# Patient Record
Sex: FEMALE | Race: OTHER | HISPANIC OR LATINO | URBAN - METROPOLITAN AREA
[De-identification: names, ages, dates, MRNs, and addresses within clinical notes are randomized per-mention and may not be internally consistent; named-entity substitution may affect disease eponyms.]

---

## 2022-04-05 ENCOUNTER — EMERGENCY (EMERGENCY)
Facility: HOSPITAL | Age: 57
LOS: 1 days | Discharge: HOME | End: 2022-04-05
Attending: EMERGENCY MEDICINE
Payer: COMMERCIAL

## 2022-04-05 VITALS
HEART RATE: 82 BPM | TEMPERATURE: 98 F | RESPIRATION RATE: 17 BRPM | OXYGEN SATURATION: 99 % | HEIGHT: 61 IN | WEIGHT: 121.03 LBS | SYSTOLIC BLOOD PRESSURE: 147 MMHG | DIASTOLIC BLOOD PRESSURE: 79 MMHG

## 2022-04-05 PROCEDURE — 99284 EMERGENCY DEPT VISIT MOD MDM: CPT

## 2022-04-05 PROCEDURE — 96372 THER/PROPH/DIAG INJ SC/IM: CPT

## 2022-04-05 PROCEDURE — 99283 EMERGENCY DEPT VISIT LOW MDM: CPT | Mod: 25

## 2022-04-05 RX ORDER — IBUPROFEN 200 MG
1 TABLET ORAL
Qty: 56 | Refills: 0
Start: 2022-04-05 | End: 2022-04-18

## 2022-04-05 RX ORDER — METHOCARBAMOL 500 MG/1
2 TABLET, FILM COATED ORAL
Qty: 30 | Refills: 0
Start: 2022-04-05 | End: 2022-04-09

## 2022-04-05 RX ORDER — ACETAMINOPHEN 500 MG
650 TABLET ORAL ONCE
Refills: 0 | Status: COMPLETED | OUTPATIENT
Start: 2022-04-05 | End: 2022-04-05

## 2022-04-05 RX ORDER — KETOROLAC TROMETHAMINE 30 MG/ML
15 SYRINGE (ML) INJECTION ONCE
Refills: 0 | Status: DISCONTINUED | OUTPATIENT
Start: 2022-04-05 | End: 2022-04-05

## 2022-04-05 RX ORDER — METHOCARBAMOL 500 MG/1
1500 TABLET, FILM COATED ORAL ONCE
Refills: 0 | Status: COMPLETED | OUTPATIENT
Start: 2022-04-05 | End: 2022-04-05

## 2022-04-05 RX ADMIN — METHOCARBAMOL 1500 MILLIGRAM(S): 500 TABLET, FILM COATED ORAL at 08:51

## 2022-04-05 RX ADMIN — Medication 15 MILLIGRAM(S): at 08:51

## 2022-04-05 RX ADMIN — Medication 650 MILLIGRAM(S): at 08:50

## 2022-04-05 NOTE — ED PROVIDER NOTE - PATIENT PORTAL LINK FT
You can access the FollowMyHealth Patient Portal offered by [facility] by registering at the following website: [email or web address]. By joining [employer]’s FollowMyHealth portal, you will also be able to view your health information using other applications (apps) compatible with our system.

## 2022-04-05 NOTE — ED ADULT NURSE NOTE - OBJECTIVE STATEMENT
Pt. c/o lower back pain that started Saturday unrelated to injury that is painful mostly when sitting not standing.

## 2022-04-05 NOTE — ED PROVIDER NOTE - NSFOLLOWUPINSTRUCTIONS_ED_ALL_ED_FT
dx muscle strain. please use heat packs to area 3x/day 20 mins each session, take motrin 600mg every 6 hrs as needed, tylenol 650mg every 4 hrs as needed, stay active, no heavy lifting and return if symptoms  worsens. see your MD for physical therapy. should last about 1 week.    istensión muscular. use bolsas de calor en el área 3 veces al día christie 20 minutos cada sesión, tome motrin 600 mg cada 6 horas según sea necesario, tylenol 650 mg cada 4 horas según sea necesario, manténgase activo, no levante objetos pesados ??y regrese si los síntomas empeoran. consulte a [facility]rence médico para terapia física. debe durar alrededor de 1 semana.

## 2022-04-05 NOTE — ED PROVIDER NOTE - PROGRESS NOTE DETAILS
delacruz: improve.   dx muscle strain. rx- robaxin and motrin. please use heat packs to area 3x/day 20 mins each session, take motrin 600mg every 6 hrs as needed, tylenol 650mg every 4 hrs as needed, stay active, no heavy lifting and return if symptoms  worsens. see your MD for physical therapy. should last about 1 week.

## 2022-04-05 NOTE — ED PROVIDER NOTE - CARDIAC, MLM
Normal rate, regular rhythm.  Heart sounds S1, S2.  No murmurs, rubs or gallops. b/l femoral pulses 2+.

## 2022-04-05 NOTE — ED PROVIDER NOTE - OBJECTIVE STATEMENT
56 yr old female with no hx presents to ed c/o right lower lumbar, entire lower pelvic pain worse with sitting and no discomfort with ambulating. works as a [employer]. no fever, no dysuria, no n/v, normal BM, no vag discharge, no rashes, no trauma.

## 2022-04-05 NOTE — ED PROVIDER NOTE - CLINICAL SUMMARY MEDICAL DECISION MAKING FREE TEXT BOX
56 yr old female with no hx presents to ed c/o right lower lumbar, entire lower pelvic pain worse with sitting and no discomfort with ambulating. works as a [employer]. no fever, no dysuria, no n/v, normal BM, no vag discharge, no rashes, no trauma.    muscle strain. no red flags. toradol, tyleno, robaxin and re-assess.

## 2022-04-05 NOTE — ED PROVIDER NOTE - MUSCULOSKELETAL, MLM
Spine appears normal, range of motion is not limited, no joint tenderness. right lower lateral paraspinal ttp.